# Patient Record
(demographics unavailable — no encounter records)

---

## 2018-04-06 NOTE — OP
DATE OF PROCEDURE:  04/05/2018

 

PREOPERATIVE DIAGNOSIS:  Menometrorrhagia.

 

POSTOPERATIVE DIAGNOSIS:  Menometrorrhagia.

 

PROCEDURES PERFORMED:  Hysteroscopy, dilation and curettage and Anette endometrial ablation.

 

ANESTHESIA:  General LMA.

 

ATTENDING SURGEON:  Eileen hCapin M.D.

 

ASSISTANT SURGEON:  None.

 

ESTIMATED BLOOD LOSS:  10 mL

 

COMPLICATIONS:  None.

 

PATHOLOGY:  Endometrial curettings.

 

DRAINS:  None.

 

FINDINGS:  On exam under anesthesia, a normal mobile 8 week size uterus, anteverted, sounded to 8 cm,
 cavity length of 5.  On hysteroscopic exam, there were no intrauterine or endometrial masses or lesi
ons.  The endometrium had a proliferative appearance.  Bilateral tubal ostia were visualized.  Follow
ing the ablation, there was good jenifer noted to all uterine walls and the fluid deficit was 0 mL for t
he case using normal saline as distention media.

 

OPERATIVE TECHNIQUE:  The patient was taken to the operating room where general anesthesia was obtain
ed without difficulty.  The patient was prepped and draped in a sterile fashion in the dorsal lithoto
my position.  A speculum was placed in the vagina and the anterior lip of the cervix was grasped with
 single tooth tenaculum.  The uterus was then sounded to 8 cm.  The cervix was progressively dilated 
with Khari dilators and the 5 mm hysteroscope was introduced into the uterine cavity using normal sali
ne as distention media with the above findings noted.  The hysteroscope was then removed and a sharp 
curettage was performed to all uterine walls and sent for final pathology.  The Anette ablation vangie
ce was then assembled and the array was inserted into the uterine fundus and the array was deployed n
oting adequate width in the green zone on the ablation device.  The cervical cap was then inflated an
d the cavity check was performed and passed.  The ablation was then performed for two full minutes an
d the array was collapsed and removed out of the patient.  Hysteroscope was then reintroduced noting 
the jenifer and no perforations and removed from the patient.  All instruments removed from the vagina. 
 The patient tolerated the procedure well.  Sponge, lap, needle counts were correct x2.  The patient 
was taken to recovery room in stable condition.